# Patient Record
Sex: MALE | Race: BLACK OR AFRICAN AMERICAN | Employment: FULL TIME | URBAN - METROPOLITAN AREA
[De-identification: names, ages, dates, MRNs, and addresses within clinical notes are randomized per-mention and may not be internally consistent; named-entity substitution may affect disease eponyms.]

---

## 2022-11-29 ENCOUNTER — OFFICE VISIT (OUTPATIENT)
Dept: ORTHOPEDIC SURGERY | Age: 27
End: 2022-11-29

## 2022-11-29 VITALS — HEIGHT: 68 IN | BODY MASS INDEX: 23.64 KG/M2 | WEIGHT: 156 LBS

## 2022-11-29 DIAGNOSIS — M76.32 IT BAND SYNDROME, LEFT: ICD-10-CM

## 2022-11-29 DIAGNOSIS — M25.562 ACUTE PAIN OF LEFT KNEE: Primary | ICD-10-CM

## 2022-11-29 NOTE — PROGRESS NOTES
Marianne Bonner \"Claudio\" is seen today for evaluation of left knee pain. He had similar pain on the right about a year ago but that has resolved. Since he ran 3/2 marathons over the span 45 days. His pain became much worse and his most recent half marathon around mile a day. He now has intermittent pain with activities of daily living including getting up from a seated position, walking, and using stairs or squatting. He has used ibuprofen and ice but has not had other treatment. He denies specific trauma. He is otherwise healthy. He is a flavor       History: Patient's relevant past family, medical, and social history are reviewed as part of today's visit. ROS of pertinent positives and negatives as above; otherwise negative. General Exam:    Vitals: Height 5' 8\" (1.727 m), weight 156 lb (70.8 kg). Constitutional: Patient is adequately groomed with no evidence of malnutrition  Mental Status: The patient is oriented to time, place and person. The patient's mood and affect are appropriate. Gait:  Patient walks with normal gait and station. Lymphatic: The lymphatic examination bilaterally reveals all areas to be without enlargement or induration. Vascular: Examination reveals no swelling or calf tenderness. Peripheral pulses are palpable and 2+. Neurological: The patient has good coordination. There is no weakness or sensory deficit. Skin:    Head/Neck: inspection reveals no rashes, ulcerations or lesions. Trunk:  inspection reveals no rashes, ulcerations or lesions. Right Lower Extremity: inspection reveals no rashes, ulcerations or lesions. Left Lower Extremity: inspection reveals no rashes, ulcerations or lesions. Hip exam reproduces discomfort in the left knee with hip abduction on the left but not the right. Right knee exam is benign. Left knee has tenderness over the lateral femoral condyle but no effusion. He is stable. He has full motion.   Calf is soft.    X-rays were obtained today in the office and interpreted by me. AP standing, PA flexed, and merchant views of the bilateral knees as well as a lateral of the left knee. These demonstrate: No bony abnormalities. Assessment: Left iliotibial band pain. Plan: I explained to him the etiology and treatment of this condition. We will initiate a physical therapy program.  He agrees with this plan.   Follow-up with me in a month

## 2022-12-14 ENCOUNTER — HOSPITAL ENCOUNTER (OUTPATIENT)
Dept: PHYSICAL THERAPY | Age: 27
Setting detail: THERAPIES SERIES
Discharge: HOME | End: 2022-12-14